# Patient Record
Sex: FEMALE | Race: OTHER | NOT HISPANIC OR LATINO
[De-identification: names, ages, dates, MRNs, and addresses within clinical notes are randomized per-mention and may not be internally consistent; named-entity substitution may affect disease eponyms.]

---

## 2017-07-26 ENCOUNTER — APPOINTMENT (OUTPATIENT)
Dept: MAMMOGRAPHY | Facility: IMAGING CENTER | Age: 57
End: 2017-07-26

## 2017-07-26 ENCOUNTER — OUTPATIENT (OUTPATIENT)
Dept: OUTPATIENT SERVICES | Facility: HOSPITAL | Age: 57
LOS: 1 days | End: 2017-07-26
Payer: MEDICAID

## 2017-07-26 DIAGNOSIS — Z00.8 ENCOUNTER FOR OTHER GENERAL EXAMINATION: ICD-10-CM

## 2017-07-26 PROCEDURE — 77067 SCR MAMMO BI INCL CAD: CPT

## 2017-07-26 PROCEDURE — 77063 BREAST TOMOSYNTHESIS BI: CPT

## 2017-10-04 ENCOUNTER — APPOINTMENT (OUTPATIENT)
Dept: OBGYN | Facility: HOSPITAL | Age: 57
End: 2017-10-04
Payer: MEDICAID

## 2017-10-04 ENCOUNTER — OUTPATIENT (OUTPATIENT)
Dept: OUTPATIENT SERVICES | Facility: HOSPITAL | Age: 57
LOS: 1 days | End: 2017-10-04

## 2017-10-04 VITALS
BODY MASS INDEX: 22.33 KG/M2 | WEIGHT: 156 LBS | HEART RATE: 76 BPM | DIASTOLIC BLOOD PRESSURE: 79 MMHG | HEIGHT: 70 IN | SYSTOLIC BLOOD PRESSURE: 132 MMHG

## 2017-10-04 DIAGNOSIS — Z83.3 FAMILY HISTORY OF DIABETES MELLITUS: ICD-10-CM

## 2017-10-04 DIAGNOSIS — Z84.1 FAMILY HISTORY OF DISORDERS OF KIDNEY AND URETER: ICD-10-CM

## 2017-10-04 DIAGNOSIS — Z82.49 FAMILY HISTORY OF ISCHEMIC HEART DISEASE AND OTHER DISEASES OF THE CIRCULATORY SYSTEM: ICD-10-CM

## 2017-10-04 PROCEDURE — 99386 PREV VISIT NEW AGE 40-64: CPT | Mod: GC

## 2017-10-05 DIAGNOSIS — Z01.419 ENCOUNTER FOR GYNECOLOGICAL EXAMINATION (GENERAL) (ROUTINE) WITHOUT ABNORMAL FINDINGS: ICD-10-CM

## 2017-10-20 ENCOUNTER — OUTPATIENT (OUTPATIENT)
Dept: OUTPATIENT SERVICES | Facility: HOSPITAL | Age: 57
LOS: 1 days | End: 2017-10-20
Payer: MEDICAID

## 2017-10-20 ENCOUNTER — APPOINTMENT (OUTPATIENT)
Dept: RADIOLOGY | Facility: IMAGING CENTER | Age: 57
End: 2017-10-20
Payer: MEDICAID

## 2017-10-20 DIAGNOSIS — Z00.8 ENCOUNTER FOR OTHER GENERAL EXAMINATION: ICD-10-CM

## 2017-10-20 PROCEDURE — 77080 DXA BONE DENSITY AXIAL: CPT

## 2017-10-20 PROCEDURE — 77080 DXA BONE DENSITY AXIAL: CPT | Mod: 26

## 2017-11-21 ENCOUNTER — APPOINTMENT (OUTPATIENT)
Dept: OPHTHALMOLOGY | Facility: CLINIC | Age: 57
End: 2017-11-21
Payer: MEDICAID

## 2017-11-21 DIAGNOSIS — Z82.49 FAMILY HISTORY OF ISCHEMIC HEART DISEASE AND OTHER DISEASES OF THE CIRCULATORY SYSTEM: ICD-10-CM

## 2017-11-21 DIAGNOSIS — Z98.890 OTHER SPECIFIED POSTPROCEDURAL STATES: ICD-10-CM

## 2017-11-21 DIAGNOSIS — Z78.9 OTHER SPECIFIED HEALTH STATUS: ICD-10-CM

## 2017-11-21 DIAGNOSIS — H04.123 DRY EYE SYNDROME OF BILATERAL LACRIMAL GLANDS: ICD-10-CM

## 2017-11-21 PROCEDURE — 92004 COMPRE OPH EXAM NEW PT 1/>: CPT

## 2017-12-21 ENCOUNTER — LABORATORY RESULT (OUTPATIENT)
Age: 57
End: 2017-12-21

## 2017-12-21 ENCOUNTER — OUTPATIENT (OUTPATIENT)
Dept: OUTPATIENT SERVICES | Facility: HOSPITAL | Age: 57
LOS: 1 days | End: 2017-12-21

## 2017-12-21 ENCOUNTER — APPOINTMENT (OUTPATIENT)
Dept: GASTROENTEROLOGY | Facility: HOSPITAL | Age: 57
End: 2017-12-21

## 2017-12-21 VITALS
SYSTOLIC BLOOD PRESSURE: 146 MMHG | DIASTOLIC BLOOD PRESSURE: 90 MMHG | HEIGHT: 70 IN | WEIGHT: 153 LBS | BODY MASS INDEX: 21.9 KG/M2 | HEART RATE: 86 BPM

## 2017-12-21 DIAGNOSIS — Z12.11 ENCOUNTER FOR SCREENING FOR MALIGNANT NEOPLASM OF COLON: ICD-10-CM

## 2017-12-21 DIAGNOSIS — Z01.419 ENCOUNTER FOR GYNECOLOGICAL EXAMINATION (GENERAL) (ROUTINE) W/OUT ABNORMAL FINDINGS: ICD-10-CM

## 2017-12-21 DIAGNOSIS — Z80.0 FAMILY HISTORY OF MALIGNANT NEOPLASM OF DIGESTIVE ORGANS: ICD-10-CM

## 2017-12-21 DIAGNOSIS — Z01.419 ENCOUNTER FOR GYNECOLOGICAL EXAMINATION (GENERAL) (ROUTINE) WITHOUT ABNORMAL FINDINGS: ICD-10-CM

## 2017-12-21 DIAGNOSIS — Z77.22 CONTACT WITH AND (SUSPECTED) EXPOSURE TO ENVIRONMENTAL TOBACCO SMOKE (ACUTE) (CHRONIC): ICD-10-CM

## 2017-12-21 LAB
ALBUMIN SERPL ELPH-MCNC: 4.1 G/DL — SIGNIFICANT CHANGE UP (ref 3.3–5)
ALP SERPL-CCNC: 54 U/L — SIGNIFICANT CHANGE UP (ref 40–120)
ALT FLD-CCNC: 13 U/L — SIGNIFICANT CHANGE UP (ref 4–33)
AST SERPL-CCNC: 18 U/L — SIGNIFICANT CHANGE UP (ref 4–32)
BASOPHILS # BLD AUTO: 0.03 K/UL — SIGNIFICANT CHANGE UP (ref 0–0.2)
BASOPHILS NFR BLD AUTO: 0.5 % — SIGNIFICANT CHANGE UP (ref 0–2)
BILIRUB SERPL-MCNC: 0.3 MG/DL — SIGNIFICANT CHANGE UP (ref 0.2–1.2)
BUN SERPL-MCNC: 11 MG/DL — SIGNIFICANT CHANGE UP (ref 7–23)
CALCIUM SERPL-MCNC: 9.2 MG/DL — SIGNIFICANT CHANGE UP (ref 8.4–10.5)
CHLORIDE SERPL-SCNC: 104 MMOL/L — SIGNIFICANT CHANGE UP (ref 98–107)
CO2 SERPL-SCNC: 28 MMOL/L — SIGNIFICANT CHANGE UP (ref 22–31)
CREAT SERPL-MCNC: 0.76 MG/DL — SIGNIFICANT CHANGE UP (ref 0.5–1.3)
EOSINOPHIL # BLD AUTO: 0.05 K/UL — SIGNIFICANT CHANGE UP (ref 0–0.5)
EOSINOPHIL NFR BLD AUTO: 0.9 % — SIGNIFICANT CHANGE UP (ref 0–6)
GLUCOSE SERPL-MCNC: 100 MG/DL — HIGH (ref 70–99)
HCT VFR BLD CALC: 38.4 % — SIGNIFICANT CHANGE UP (ref 34.5–45)
HCV AB S/CO SERPL IA: 0.18 S/CO — SIGNIFICANT CHANGE UP
HCV AB SERPL-IMP: SIGNIFICANT CHANGE UP
HGB BLD-MCNC: 11.9 G/DL — SIGNIFICANT CHANGE UP (ref 11.5–15.5)
IMM GRANULOCYTES # BLD AUTO: 0.01 # — SIGNIFICANT CHANGE UP
IMM GRANULOCYTES NFR BLD AUTO: 0.2 % — SIGNIFICANT CHANGE UP (ref 0–1.5)
LYMPHOCYTES # BLD AUTO: 2.09 K/UL — SIGNIFICANT CHANGE UP (ref 1–3.3)
LYMPHOCYTES # BLD AUTO: 38.1 % — SIGNIFICANT CHANGE UP (ref 13–44)
MCHC RBC-ENTMCNC: 23.1 PG — LOW (ref 27–34)
MCHC RBC-ENTMCNC: 31 % — LOW (ref 32–36)
MCV RBC AUTO: 74.6 FL — LOW (ref 80–100)
MONOCYTES # BLD AUTO: 0.34 K/UL — SIGNIFICANT CHANGE UP (ref 0–0.9)
MONOCYTES NFR BLD AUTO: 6.2 % — SIGNIFICANT CHANGE UP (ref 2–14)
NEUTROPHILS # BLD AUTO: 2.96 K/UL — SIGNIFICANT CHANGE UP (ref 1.8–7.4)
NEUTROPHILS NFR BLD AUTO: 54.1 % — SIGNIFICANT CHANGE UP (ref 43–77)
NRBC # FLD: 0 — SIGNIFICANT CHANGE UP
PLATELET # BLD AUTO: 178 K/UL — SIGNIFICANT CHANGE UP (ref 150–400)
PMV BLD: 12.4 FL — SIGNIFICANT CHANGE UP (ref 7–13)
POTASSIUM SERPL-MCNC: 4.9 MMOL/L — SIGNIFICANT CHANGE UP (ref 3.5–5.3)
POTASSIUM SERPL-SCNC: 4.9 MMOL/L — SIGNIFICANT CHANGE UP (ref 3.5–5.3)
PROT SERPL-MCNC: 7.1 G/DL — SIGNIFICANT CHANGE UP (ref 6–8.3)
RBC # BLD: 5.15 M/UL — SIGNIFICANT CHANGE UP (ref 3.8–5.2)
RBC # FLD: 14.4 % — SIGNIFICANT CHANGE UP (ref 10.3–14.5)
SODIUM SERPL-SCNC: 143 MMOL/L — SIGNIFICANT CHANGE UP (ref 135–145)
WBC # BLD: 5.48 K/UL — SIGNIFICANT CHANGE UP (ref 3.8–10.5)
WBC # FLD AUTO: 5.48 K/UL — SIGNIFICANT CHANGE UP (ref 3.8–10.5)

## 2017-12-27 ENCOUNTER — CHART COPY (OUTPATIENT)
Age: 57
End: 2017-12-27

## 2018-01-16 ENCOUNTER — OUTPATIENT (OUTPATIENT)
Dept: OUTPATIENT SERVICES | Facility: HOSPITAL | Age: 58
LOS: 1 days | Discharge: HOME | End: 2018-01-16
Payer: MEDICAID

## 2018-01-16 DIAGNOSIS — Z12.11 ENCOUNTER FOR SCREENING FOR MALIGNANT NEOPLASM OF COLON: ICD-10-CM

## 2018-01-16 PROCEDURE — G0121 COLON CA SCRN NOT HI RSK IND: CPT | Mod: GC

## 2018-02-08 ENCOUNTER — APPOINTMENT (OUTPATIENT)
Dept: GASTROENTEROLOGY | Facility: HOSPITAL | Age: 58
End: 2018-02-08
Payer: MEDICAID

## 2018-02-08 ENCOUNTER — OUTPATIENT (OUTPATIENT)
Dept: OUTPATIENT SERVICES | Facility: HOSPITAL | Age: 58
LOS: 1 days | End: 2018-02-08

## 2018-02-08 VITALS
HEIGHT: 70 IN | SYSTOLIC BLOOD PRESSURE: 131 MMHG | HEART RATE: 69 BPM | DIASTOLIC BLOOD PRESSURE: 87 MMHG | BODY MASS INDEX: 22.33 KG/M2 | WEIGHT: 155.97 LBS

## 2018-02-08 DIAGNOSIS — K59.09 OTHER CONSTIPATION: ICD-10-CM

## 2018-02-08 DIAGNOSIS — R71.8 OTHER ABNORMALITY OF RED BLOOD CELLS: ICD-10-CM

## 2018-02-08 DIAGNOSIS — Z86.010 PERSONAL HISTORY OF COLONIC POLYPS: ICD-10-CM

## 2018-02-08 DIAGNOSIS — Z83.71 FAMILY HISTORY OF COLONIC POLYPS: ICD-10-CM

## 2018-02-08 DIAGNOSIS — K57.90 DIVERTICULOSIS OF INTESTINE, PART UNSPECIFIED, WITHOUT PERFORATION OR ABSCESS WITHOUT BLEEDING: ICD-10-CM

## 2018-02-08 DIAGNOSIS — Z12.11 ENCOUNTER FOR SCREENING FOR MALIGNANT NEOPLASM OF COLON: ICD-10-CM

## 2018-02-08 DIAGNOSIS — K57.90 DIVERTICULOSIS OF INTESTINE, PART UNSPECIFIED, W/OUT PERFORATION OR ABSCESS W/OUT BLEEDING: ICD-10-CM

## 2018-02-08 PROCEDURE — 99213 OFFICE O/P EST LOW 20 MIN: CPT | Mod: GC

## 2018-02-08 RX ORDER — MAGNESIUM CITRATE
SOLUTION, ORAL ORAL
Qty: 2 | Refills: 0 | Status: COMPLETED | COMMUNITY
Start: 2017-12-21 | End: 2018-02-08

## 2018-07-27 PROBLEM — Z80.0 FAMILY HISTORY OF COLON CANCER: Status: INACTIVE | Noted: 2017-12-21

## 2020-12-16 PROBLEM — Z12.11 ENCOUNTER FOR SCREENING COLONOSCOPY: Status: RESOLVED | Noted: 2017-12-21 | Resolved: 2020-12-16

## 2021-08-20 ENCOUNTER — OUTPATIENT (OUTPATIENT)
Dept: OUTPATIENT SERVICES | Facility: HOSPITAL | Age: 61
LOS: 1 days | End: 2021-08-20
Payer: MEDICAID

## 2021-08-20 ENCOUNTER — APPOINTMENT (OUTPATIENT)
Dept: MAMMOGRAPHY | Facility: IMAGING CENTER | Age: 61
End: 2021-08-20
Payer: MEDICAID

## 2021-08-20 DIAGNOSIS — Z00.8 ENCOUNTER FOR OTHER GENERAL EXAMINATION: ICD-10-CM

## 2021-08-20 PROCEDURE — 77067 SCR MAMMO BI INCL CAD: CPT | Mod: 26

## 2021-08-20 PROCEDURE — 77063 BREAST TOMOSYNTHESIS BI: CPT | Mod: 26

## 2021-08-20 PROCEDURE — 77067 SCR MAMMO BI INCL CAD: CPT

## 2021-08-20 PROCEDURE — 77063 BREAST TOMOSYNTHESIS BI: CPT

## 2021-08-27 ENCOUNTER — OUTPATIENT (OUTPATIENT)
Dept: OUTPATIENT SERVICES | Facility: HOSPITAL | Age: 61
LOS: 1 days | End: 2021-08-27
Payer: MEDICAID

## 2021-08-27 ENCOUNTER — APPOINTMENT (OUTPATIENT)
Dept: MAMMOGRAPHY | Facility: IMAGING CENTER | Age: 61
End: 2021-08-27
Payer: MEDICAID

## 2021-08-27 ENCOUNTER — APPOINTMENT (OUTPATIENT)
Dept: ULTRASOUND IMAGING | Facility: IMAGING CENTER | Age: 61
End: 2021-08-27
Payer: MEDICAID

## 2021-08-27 DIAGNOSIS — Z00.8 ENCOUNTER FOR OTHER GENERAL EXAMINATION: ICD-10-CM

## 2021-08-27 PROCEDURE — 77061 BREAST TOMOSYNTHESIS UNI: CPT | Mod: 26

## 2021-08-27 PROCEDURE — 76642 ULTRASOUND BREAST LIMITED: CPT

## 2021-08-27 PROCEDURE — 76642 ULTRASOUND BREAST LIMITED: CPT | Mod: 26,RT

## 2021-08-27 PROCEDURE — 77065 DX MAMMO INCL CAD UNI: CPT | Mod: 26,RT

## 2021-08-27 PROCEDURE — G0279: CPT

## 2021-08-27 PROCEDURE — 77065 DX MAMMO INCL CAD UNI: CPT

## 2021-09-01 ENCOUNTER — RESULT REVIEW (OUTPATIENT)
Age: 61
End: 2021-09-01

## 2021-09-01 ENCOUNTER — APPOINTMENT (OUTPATIENT)
Dept: ULTRASOUND IMAGING | Facility: IMAGING CENTER | Age: 61
End: 2021-09-01

## 2021-09-01 ENCOUNTER — OUTPATIENT (OUTPATIENT)
Dept: OUTPATIENT SERVICES | Facility: HOSPITAL | Age: 61
LOS: 1 days | End: 2021-09-01
Payer: MEDICAID

## 2021-09-01 ENCOUNTER — APPOINTMENT (OUTPATIENT)
Dept: ULTRASOUND IMAGING | Facility: IMAGING CENTER | Age: 61
End: 2021-09-01
Payer: MEDICAID

## 2021-09-01 DIAGNOSIS — Z00.8 ENCOUNTER FOR OTHER GENERAL EXAMINATION: ICD-10-CM

## 2021-09-01 PROCEDURE — 88305 TISSUE EXAM BY PATHOLOGIST: CPT

## 2021-09-01 PROCEDURE — 77065 DX MAMMO INCL CAD UNI: CPT

## 2021-09-01 PROCEDURE — 19084 BX BREAST ADD LESION US IMAG: CPT

## 2021-09-01 PROCEDURE — 88305 TISSUE EXAM BY PATHOLOGIST: CPT | Mod: 26

## 2021-09-01 PROCEDURE — 77065 DX MAMMO INCL CAD UNI: CPT | Mod: 26,RT

## 2021-09-01 PROCEDURE — A4648: CPT

## 2021-09-01 PROCEDURE — 19084 BX BREAST ADD LESION US IMAG: CPT | Mod: RT

## 2021-09-01 PROCEDURE — 19083 BX BREAST 1ST LESION US IMAG: CPT

## 2021-09-01 PROCEDURE — 19083 BX BREAST 1ST LESION US IMAG: CPT | Mod: RT

## 2021-09-27 ENCOUNTER — APPOINTMENT (OUTPATIENT)
Dept: SURGICAL ONCOLOGY | Facility: CLINIC | Age: 61
End: 2021-09-27
Payer: MEDICAID

## 2021-09-27 VITALS
HEART RATE: 79 BPM | OXYGEN SATURATION: 97 % | DIASTOLIC BLOOD PRESSURE: 79 MMHG | HEIGHT: 69 IN | SYSTOLIC BLOOD PRESSURE: 124 MMHG | BODY MASS INDEX: 25.18 KG/M2 | WEIGHT: 170 LBS

## 2021-09-27 PROCEDURE — 99214 OFFICE O/P EST MOD 30 MIN: CPT

## 2021-09-27 NOTE — HISTORY OF PRESENT ILLNESS
[de-identified] : Polina is a pleasant 61 year old female who presents today for an initial consultation.\par \par She underwent her annual routine screening mammogram and sonogram on 8/21/21. This study demonstrated focal asymmetry within the lower right breast, as above. Further evaluation with full right ML view, spot compression views, and targeted ultrasound, as warranted, is advised, BI-RADS 0.\par \par As follow up, she underwent a right diagnostic mammogram and target right breast ultrasound on 8/30/21. This noted highly suspicious masses at right breast 3:00 and 6:00. Ultrasound-guided core needle biopsy is recommended for further evaluation, BI-RADS 5. \par \par She underwent a right breast US guided biopsy x 2 sites on 9/9/21. Right breast 3:00 2 cm FN, complex sclerosing papillary lesion focal microcalcifications.  Right breast 6:00 3 cm FN, intraductal papilloma, proliferative fibrocystic change with microcalcifications. These results were concordant.\par \par She has no significant past medical history. She states in the past she was anemic in the past due to fibroids, she had a hysterectomy 2005. She denies a personal or family history of breast cancer. \par \par Today, she denies palpable breast masses, nipple discharge, skin changes, inversion or breast pain. Denies constitutional symptoms.\par

## 2021-09-27 NOTE — CONSULT LETTER
[Dear  ___] : Dear  [unfilled], [Consult Letter:] : I had the pleasure of evaluating your patient, [unfilled]. [Please see my note below.] : Please see my note below. [Consult Closing:] : Thank you very much for allowing me to participate in the care of this patient.  If you have any questions, please do not hesitate to contact me. [Sincerely,] : Sincerely, [FreeTextEntry2] : Matt Louis MD [FreeTextEntry3] : Umesh Quan MD\par Surgical Oncology\par [facility]\par Office: [phone]\par Cell: [phone]\par

## 2021-09-27 NOTE — PHYSICAL EXAM
[FreeTextEntry1] : SM present during exam.  [Normal] : normal breast inspection and palpation of axillas [Normal Neck Lymph Nodes] : normal neck lymph nodes  [Normal Supraclavicular Lymph Nodes] : normal supraclavicular lymph nodes [Normal Groin Lymph Nodes] : normal groin lymph nodes [Normal Axillary Lymph Nodes] : normal axillary lymph nodes [Normal] : oriented to person, place and time, with appropriate affect

## 2021-09-27 NOTE — ASSESSMENT
[FreeTextEntry1] : We discussed the need for 2 sites and  localized right breast excisional biopsy. We discussed the risks, benefits and alternatives of the procedure.  We also discussed post operative expectations and possible complications.  She expresses understanding and agrees to proceed.\par

## 2021-10-11 ENCOUNTER — OUTPATIENT (OUTPATIENT)
Dept: OUTPATIENT SERVICES | Facility: HOSPITAL | Age: 61
LOS: 1 days | End: 2021-10-11

## 2021-10-11 VITALS
DIASTOLIC BLOOD PRESSURE: 80 MMHG | WEIGHT: 169.98 LBS | SYSTOLIC BLOOD PRESSURE: 130 MMHG | HEART RATE: 80 BPM | TEMPERATURE: 98 F | RESPIRATION RATE: 16 BRPM | HEIGHT: 67 IN | OXYGEN SATURATION: 99 %

## 2021-10-11 DIAGNOSIS — D24.1 BENIGN NEOPLASM OF RIGHT BREAST: ICD-10-CM

## 2021-10-11 DIAGNOSIS — Z90.710 ACQUIRED ABSENCE OF BOTH CERVIX AND UTERUS: Chronic | ICD-10-CM

## 2021-10-11 NOTE — H&P PST ADULT - PROBLEM SELECTOR PLAN 1
Patient tentatively scheduled for excision right breast lumpectomy x 2 post saviscout localization x2 on 10/19/21.  Pre-op instructions provided. Pt given verbal and written instructions with teach back on chlorhexidine shampoo and pepcid. Pt verbalized understanding with return demonstration.   Covid testing scheduled prior to surgery as per patient.   Patient obtained medical evaluation as per surgeon, Copy in chart.

## 2021-10-11 NOTE — H&P PST ADULT - HISTORY OF PRESENT ILLNESS
61 year old female with no PMH  presents to Presurgical testing with diagnosis of benign neoplasm of right breast scheduled for excision right breast lumpectomy x 2 post saviscout localization x2. Patient reports that she had her routine mammogram found to be abnormal, s/p biopsy which revealed  papillary lesion. Patient c/o mild tenderness to right breast s/p biopsy

## 2021-10-11 NOTE — H&P PST ADULT - ATTENDING COMMENTS
D/w pt plan for right breast papilloma, complex sclerosing lesion exicision with saviscout loc excision x 2    Discussed r/b/a post op expectations poss complications.      Pt understands and agrees to proceed.

## 2021-10-15 ENCOUNTER — APPOINTMENT (OUTPATIENT)
Dept: ULTRASOUND IMAGING | Facility: IMAGING CENTER | Age: 61
End: 2021-10-15
Payer: MEDICAID

## 2021-10-15 ENCOUNTER — RESULT REVIEW (OUTPATIENT)
Age: 61
End: 2021-10-15

## 2021-10-15 ENCOUNTER — OUTPATIENT (OUTPATIENT)
Dept: OUTPATIENT SERVICES | Facility: HOSPITAL | Age: 61
LOS: 1 days | End: 2021-10-15
Payer: MEDICAID

## 2021-10-15 DIAGNOSIS — Z90.710 ACQUIRED ABSENCE OF BOTH CERVIX AND UTERUS: Chronic | ICD-10-CM

## 2021-10-15 DIAGNOSIS — Z00.8 ENCOUNTER FOR OTHER GENERAL EXAMINATION: ICD-10-CM

## 2021-10-15 PROBLEM — Z78.9 OTHER SPECIFIED HEALTH STATUS: Chronic | Status: ACTIVE | Noted: 2021-10-11

## 2021-10-15 PROCEDURE — 19285 PERQ DEV BREAST 1ST US IMAG: CPT

## 2021-10-15 PROCEDURE — 19286 PERQ DEV BREAST ADD US IMAG: CPT

## 2021-10-15 PROCEDURE — C1739: CPT

## 2021-10-15 PROCEDURE — 19285 PERQ DEV BREAST 1ST US IMAG: CPT | Mod: RT

## 2021-10-15 PROCEDURE — 19286 PERQ DEV BREAST ADD US IMAG: CPT | Mod: RT

## 2021-10-16 ENCOUNTER — APPOINTMENT (OUTPATIENT)
Dept: DISASTER EMERGENCY | Facility: CLINIC | Age: 61
End: 2021-10-16

## 2021-10-16 DIAGNOSIS — Z01.818 ENCOUNTER FOR OTHER PREPROCEDURAL EXAMINATION: ICD-10-CM

## 2021-10-17 LAB — SARS-COV-2 N GENE NPH QL NAA+PROBE: NOT DETECTED

## 2021-10-18 ENCOUNTER — TRANSCRIPTION ENCOUNTER (OUTPATIENT)
Age: 61
End: 2021-10-18

## 2021-10-18 VITALS
DIASTOLIC BLOOD PRESSURE: 74 MMHG | RESPIRATION RATE: 16 BRPM | WEIGHT: 169.98 LBS | SYSTOLIC BLOOD PRESSURE: 143 MMHG | OXYGEN SATURATION: 100 % | HEIGHT: 67 IN | TEMPERATURE: 97 F | HEART RATE: 90 BPM

## 2021-10-19 ENCOUNTER — RESULT REVIEW (OUTPATIENT)
Age: 61
End: 2021-10-19

## 2021-10-19 ENCOUNTER — NON-APPOINTMENT (OUTPATIENT)
Age: 61
End: 2021-10-19

## 2021-10-19 ENCOUNTER — APPOINTMENT (OUTPATIENT)
Dept: SURGICAL ONCOLOGY | Facility: AMBULATORY SURGERY CENTER | Age: 61
End: 2021-10-19

## 2021-10-19 ENCOUNTER — OUTPATIENT (OUTPATIENT)
Dept: OUTPATIENT SERVICES | Facility: HOSPITAL | Age: 61
LOS: 1 days | Discharge: HOME | End: 2021-10-19
Payer: MEDICAID

## 2021-10-19 ENCOUNTER — APPOINTMENT (OUTPATIENT)
Dept: MAMMOGRAPHY | Facility: IMAGING CENTER | Age: 61
End: 2021-10-19
Payer: MEDICAID

## 2021-10-19 ENCOUNTER — OUTPATIENT (OUTPATIENT)
Dept: OUTPATIENT SERVICES | Facility: HOSPITAL | Age: 61
LOS: 1 days | End: 2021-10-19
Payer: COMMERCIAL

## 2021-10-19 VITALS
TEMPERATURE: 97 F | OXYGEN SATURATION: 98 % | SYSTOLIC BLOOD PRESSURE: 115 MMHG | DIASTOLIC BLOOD PRESSURE: 60 MMHG | RESPIRATION RATE: 17 BRPM | HEART RATE: 69 BPM

## 2021-10-19 DIAGNOSIS — Z90.710 ACQUIRED ABSENCE OF BOTH CERVIX AND UTERUS: Chronic | ICD-10-CM

## 2021-10-19 DIAGNOSIS — Z00.8 ENCOUNTER FOR OTHER GENERAL EXAMINATION: ICD-10-CM

## 2021-10-19 DIAGNOSIS — D24.1 BENIGN NEOPLASM OF RIGHT BREAST: ICD-10-CM

## 2021-10-19 PROCEDURE — 19125 EXCISION BREAST LESION: CPT

## 2021-10-19 PROCEDURE — 76098 X-RAY EXAM SURGICAL SPECIMEN: CPT | Mod: 26

## 2021-10-19 PROCEDURE — 76098 X-RAY EXAM SURGICAL SPECIMEN: CPT

## 2021-10-19 PROCEDURE — 88307 TISSUE EXAM BY PATHOLOGIST: CPT | Mod: 26

## 2021-10-19 RX ORDER — PREGABALIN 225 MG/1
1 CAPSULE ORAL
Qty: 0 | Refills: 0 | DISCHARGE

## 2021-10-19 RX ORDER — SODIUM CHLORIDE 9 MG/ML
1000 INJECTION, SOLUTION INTRAVENOUS
Refills: 0 | Status: DISCONTINUED | OUTPATIENT
Start: 2021-10-19 | End: 2021-11-02

## 2021-10-19 NOTE — ASU DISCHARGE PLAN (ADULT/PEDIATRIC) - CARE PROVIDER_API CALL
Umesh Quan)  Surgery  [address] [place] [address]  Phone: [phone]  Fax: [phone]  Follow Up Time:

## 2021-10-19 NOTE — ASU DISCHARGE PLAN (ADULT/PEDIATRIC) - ASU DC SPECIAL INSTRUCTIONSFT
Call Dr. Quan's office tomorrow for a follow up appointment in 2 weeks, post right breast lumpectomy.   Sponge bathe until the day after tomorrow, 48 hours.   Do not take a bath for 2 weeks.  In 2 days, shower, take the clear bandage off of the breast with the white gauze. KEEP on the steri strips (white tape) on until they fall off in about 7-10 days. When you shower, just allow water to run on the breast, do not scrub. Pat dry gently and keep on the white tape.    Take Percocet as needed for pain, as directed and sent to your pharmacy.

## 2021-10-19 NOTE — ASU DISCHARGE PLAN (ADULT/PEDIATRIC) - BATHING
for 2 days/Sponge only/Do not submerge in water for 2 days/Shower only/Sponge only/Do not submerge in water

## 2021-10-19 NOTE — ASU DISCHARGE PLAN (ADULT/PEDIATRIC) - NURSING INSTRUCTIONS
DO NOT take any Tylenol (Acetaminophen) or narcotics containing Tylenol until after  3:30pm . You received Tylenol during your operation and it can cause damage to your liver if too much is taken within a 24 hour time period.

## 2021-10-26 LAB — SURGICAL PATHOLOGY STUDY: SIGNIFICANT CHANGE UP

## 2021-11-01 ENCOUNTER — APPOINTMENT (OUTPATIENT)
Dept: SURGICAL ONCOLOGY | Facility: CLINIC | Age: 61
End: 2021-11-01
Payer: MEDICAID

## 2021-11-04 ENCOUNTER — APPOINTMENT (OUTPATIENT)
Dept: SURGICAL ONCOLOGY | Facility: CLINIC | Age: 61
End: 2021-11-04
Payer: MEDICAID

## 2021-11-04 VITALS
HEART RATE: 82 BPM | OXYGEN SATURATION: 97 % | SYSTOLIC BLOOD PRESSURE: 110 MMHG | TEMPERATURE: 97 F | DIASTOLIC BLOOD PRESSURE: 77 MMHG | RESPIRATION RATE: 17 BRPM

## 2021-11-04 PROCEDURE — 99024 POSTOP FOLLOW-UP VISIT: CPT

## 2021-11-09 NOTE — PHYSICAL EXAM
[Normal] : well developed, well nourished, in no acute distress [de-identified] : Left breast incision healing well with no evidence of infection or hematoma, resolving ecchymosis

## 2021-11-09 NOTE — CONSULT LETTER
[FreeTextEntry2] : Matt Louis MD [FreeTextEntry3] : Umesh Quan MD\par Surgical Oncology\par [facility]\par Office: [phone]\par Cell: [phone]\par

## 2021-11-09 NOTE — HISTORY OF PRESENT ILLNESS
[de-identified] : Polina is a pleasant 61 year old female who presents today for an initial  post operative visit. She is s/p TIBURCIO  localized right breast excisional biopsies x 2 on 10/19/21. Final pathology revealed a diagnosis of proliferative fibrocystic changes including multiple complex sclerosing lesions, florid usual duct hyperplasia, intraductal papillomatosis, sclerosing adenosis and microcalcifications.  Her left breast feels sore but there is no significant pain and she denies any fever, erythema or drainage at the incision site.\par \par PREVIOUS HISTORY:\par \par She underwent her annual routine screening mammogram and sonogram on 8/21/21. This study demonstrated focal asymmetry within the lower right breast, as above. Further evaluation with full right ML view, spot compression views, and targeted ultrasound, as warranted, is advised, BI-RADS 0.\par \par As follow up, she underwent a right diagnostic mammogram and target right breast ultrasound on 8/30/21. This noted highly suspicious masses at right breast 3:00 and 6:00. Ultrasound-guided core needle biopsy is recommended for further evaluation, BI-RADS 5. \par \par She underwent a right breast US guided biopsy x 2 sites on 9/9/21. Right breast 3:00 2 cm FN, complex sclerosing papillary lesion focal microcalcifications.  Right breast 6:00 3 cm FN, intraductal papilloma, proliferative fibrocystic change with microcalcifications. These results were concordant.\par \par She has no significant past medical history. She states in the past she was anemic in the past due to fibroids, she had a hysterectomy 2005. She denies a personal or family history of breast cancer. \par \par

## 2022-02-14 ENCOUNTER — RESULT REVIEW (OUTPATIENT)
Age: 62
End: 2022-02-14

## 2022-02-14 ENCOUNTER — APPOINTMENT (OUTPATIENT)
Dept: SURGICAL ONCOLOGY | Facility: CLINIC | Age: 62
End: 2022-02-14
Payer: MEDICAID

## 2022-02-14 VITALS
DIASTOLIC BLOOD PRESSURE: 86 MMHG | HEIGHT: 69 IN | SYSTOLIC BLOOD PRESSURE: 143 MMHG | TEMPERATURE: 97.6 F | WEIGHT: 168 LBS | HEART RATE: 103 BPM | BODY MASS INDEX: 24.88 KG/M2 | RESPIRATION RATE: 17 BRPM | OXYGEN SATURATION: 95 %

## 2022-02-14 PROCEDURE — 99214 OFFICE O/P EST MOD 30 MIN: CPT

## 2022-02-14 NOTE — HISTORY OF PRESENT ILLNESS
[de-identified] : Polina is a pleasant 61 year old female who presents today for a follow up visit. She is s/p TIBURCIO  localized right breast excisional biopsies x 2 on 10/19/21. Final pathology revealed a diagnosis of proliferative fibrocystic changes including multiple complex sclerosing lesions, florid usual duct hyperplasia, intraductal papillomatosis, sclerosing adenosis and microcalcifications.  Her left breast feels sore but there is no significant pain and she denies any fever, erythema or drainage at the incision site.\par \par \par PREVIOUS HISTORY:\par She underwent her annual routine screening mammogram and sonogram on 8/21/21. This study demonstrated focal asymmetry within the lower right breast, as above. Further evaluation with full right ML view, spot compression views, and targeted ultrasound, as warranted, is advised, BI-RADS 0.\par \par As follow up, she underwent a right diagnostic mammogram and target right breast ultrasound on 8/30/21. This noted highly suspicious masses at right breast 3:00 and 6:00. Ultrasound-guided core needle biopsy is recommended for further evaluation, BI-RADS 5. \par \par She underwent a right breast US guided biopsy x 2 sites on 9/9/21. Right breast 3:00 2 cm FN, complex sclerosing papillary lesion focal microcalcifications.  Right breast 6:00 3 cm FN, intraductal papilloma, proliferative fibrocystic change with microcalcifications. These results were concordant.\par \par She has no significant past medical history. She states in the past she was anemic in the past due to fibroids, she had a hysterectomy 2005. She denies a personal or family history of breast cancer. \par \par

## 2022-02-14 NOTE — PHYSICAL EXAM
[Normal] : normal breast inspection and palpation of axillas [Normal Neck Lymph Nodes] : normal neck lymph nodes  [Normal Supraclavicular Lymph Nodes] : normal supraclavicular lymph nodes [Normal Groin Lymph Nodes] : normal groin lymph nodes [Normal Axillary Lymph Nodes] : normal axillary lymph nodes [Normal] : oriented to person, place and time, with appropriate affect [de-identified] : Well-healed right breast circumareolar incision.

## 2022-08-19 ENCOUNTER — RESULT REVIEW (OUTPATIENT)
Age: 62
End: 2022-08-19

## 2022-08-19 ENCOUNTER — APPOINTMENT (OUTPATIENT)
Dept: MAMMOGRAPHY | Facility: IMAGING CENTER | Age: 62
End: 2022-08-19

## 2022-08-19 ENCOUNTER — APPOINTMENT (OUTPATIENT)
Dept: ULTRASOUND IMAGING | Facility: IMAGING CENTER | Age: 62
End: 2022-08-19

## 2022-08-19 ENCOUNTER — OUTPATIENT (OUTPATIENT)
Dept: OUTPATIENT SERVICES | Facility: HOSPITAL | Age: 62
LOS: 1 days | End: 2022-08-19
Payer: MEDICAID

## 2022-08-19 DIAGNOSIS — Z00.8 ENCOUNTER FOR OTHER GENERAL EXAMINATION: ICD-10-CM

## 2022-08-19 DIAGNOSIS — Z90.710 ACQUIRED ABSENCE OF BOTH CERVIX AND UTERUS: Chronic | ICD-10-CM

## 2022-08-19 PROCEDURE — 77066 DX MAMMO INCL CAD BI: CPT | Mod: 26

## 2022-08-19 PROCEDURE — G0279: CPT

## 2022-08-19 PROCEDURE — 76641 ULTRASOUND BREAST COMPLETE: CPT | Mod: 26,50

## 2022-08-19 PROCEDURE — 77066 DX MAMMO INCL CAD BI: CPT

## 2022-08-19 PROCEDURE — 76641 ULTRASOUND BREAST COMPLETE: CPT

## 2022-08-19 PROCEDURE — 77062 BREAST TOMOSYNTHESIS BI: CPT | Mod: 26

## 2022-08-25 ENCOUNTER — APPOINTMENT (OUTPATIENT)
Dept: SURGICAL ONCOLOGY | Facility: CLINIC | Age: 62
End: 2022-08-25

## 2022-08-25 VITALS
HEART RATE: 69 BPM | DIASTOLIC BLOOD PRESSURE: 85 MMHG | OXYGEN SATURATION: 98 % | HEIGHT: 69 IN | TEMPERATURE: 98.8 F | WEIGHT: 170 LBS | RESPIRATION RATE: 16 BRPM | SYSTOLIC BLOOD PRESSURE: 125 MMHG | BODY MASS INDEX: 25.18 KG/M2

## 2022-08-25 PROCEDURE — 99214 OFFICE O/P EST MOD 30 MIN: CPT

## 2022-08-25 NOTE — CONSULT LETTER
[Dear  ___] : Dear  [unfilled], [Consult Letter:] : I had the pleasure of evaluating your patient, [unfilled]. [Please see my note below.] : Please see my note below. [Consult Closing:] : Thank you very much for allowing me to participate in the care of this patient.  If you have any questions, please do not hesitate to contact me. [Sincerely,] : Sincerely, [FreeTextEntry2] : Matt Louis MD \par  [FreeTextEntry3] : Umesh Quan MD\par Surgical Oncology\par [facility]\par Office: [phone]\par Cell: [phone]\par

## 2022-08-25 NOTE — PHYSICAL EXAM
[Normal] : normal breast inspection and palpation of axillas [Normal Neck Lymph Nodes] : normal neck lymph nodes  [Normal Supraclavicular Lymph Nodes] : normal supraclavicular lymph nodes [Normal Groin Lymph Nodes] : normal groin lymph nodes [Normal Axillary Lymph Nodes] : normal axillary lymph nodes [Normal] : oriented to person, place and time, with appropriate affect [FreeTextEntry1] : Medical staff ( MB  ) present during exam\par  [de-identified] : Well-healed right breast circumareolar incision.

## 2022-08-25 NOTE — HISTORY OF PRESENT ILLNESS
[de-identified] : Polina is a pleasant 62 year old female who presents today for a follow up visit. She is s/p TIBURCIO  localized right breast excisional biopsies x 2 on 10/19/21. Final pathology revealed a diagnosis of proliferative fibrocystic changes including multiple complex sclerosing lesions, florid usual duct hyperplasia, intraductal papillomatosis, sclerosing adenosis and microcalcifications.  Her left breast feels sore but there is no significant pain and she denies any fever, erythema or drainage at the incision site.\par \par Her most recent MMG/ US on 8/19/22 revealed no evidence of malignancy BI-RADS 2\par \par PREVIOUS HISTORY:\par She underwent her annual routine screening mammogram and sonogram on 8/21/21. This study demonstrated focal asymmetry within the lower right breast, as above. Further evaluation with full right ML view, spot compression views, and targeted ultrasound, as warranted, is advised, BI-RADS 0.\par \par As follow up, she underwent a right diagnostic mammogram and target right breast ultrasound on 8/30/21. This noted highly suspicious masses at right breast 3:00 and 6:00. Ultrasound-guided core needle biopsy is recommended for further evaluation, BI-RADS 5. \par \par She underwent a right breast US guided biopsy x 2 sites on 9/9/21. Right breast 3:00 2 cm FN, complex sclerosing papillary lesion focal microcalcifications.  Right breast 6:00 3 cm FN, intraductal papilloma, proliferative fibrocystic change with microcalcifications. These results were concordant.\par \par She has no significant past medical history. She states in the past she was anemic in the past due to fibroids, she had a hysterectomy 2005. She denies a personal or family history of breast cancer. \par \par

## 2022-08-25 NOTE — ASSESSMENT
[FreeTextEntry1] : Polina is doing well.  She will follow-up with us in 1 year upon completion of annual breast imaging.

## 2023-08-21 ENCOUNTER — APPOINTMENT (OUTPATIENT)
Dept: SURGICAL ONCOLOGY | Facility: CLINIC | Age: 63
End: 2023-08-21

## 2023-08-22 ENCOUNTER — RESULT REVIEW (OUTPATIENT)
Age: 63
End: 2023-08-22

## 2023-08-22 ENCOUNTER — APPOINTMENT (OUTPATIENT)
Dept: ULTRASOUND IMAGING | Facility: IMAGING CENTER | Age: 63
End: 2023-08-22
Payer: MEDICAID

## 2023-08-22 ENCOUNTER — APPOINTMENT (OUTPATIENT)
Dept: MAMMOGRAPHY | Facility: IMAGING CENTER | Age: 63
End: 2023-08-22
Payer: MEDICAID

## 2023-08-22 ENCOUNTER — OUTPATIENT (OUTPATIENT)
Dept: OUTPATIENT SERVICES | Facility: HOSPITAL | Age: 63
LOS: 1 days | End: 2023-08-22
Payer: MEDICAID

## 2023-08-22 DIAGNOSIS — D24.1 BENIGN NEOPLASM OF RIGHT BREAST: ICD-10-CM

## 2023-08-22 DIAGNOSIS — Z90.710 ACQUIRED ABSENCE OF BOTH CERVIX AND UTERUS: Chronic | ICD-10-CM

## 2023-08-22 PROCEDURE — 77067 SCR MAMMO BI INCL CAD: CPT

## 2023-08-22 PROCEDURE — 77063 BREAST TOMOSYNTHESIS BI: CPT | Mod: 26

## 2023-08-22 PROCEDURE — 77067 SCR MAMMO BI INCL CAD: CPT | Mod: 26

## 2023-08-22 PROCEDURE — 76641 ULTRASOUND BREAST COMPLETE: CPT | Mod: 26,50

## 2023-08-22 PROCEDURE — 77063 BREAST TOMOSYNTHESIS BI: CPT

## 2023-08-22 PROCEDURE — 76641 ULTRASOUND BREAST COMPLETE: CPT

## 2023-08-24 PROBLEM — D24.1 INTRADUCTAL PAPILLOMA OF BREAST, RIGHT: Status: ACTIVE | Noted: 2021-09-24

## 2023-08-31 ENCOUNTER — APPOINTMENT (OUTPATIENT)
Dept: SURGICAL ONCOLOGY | Facility: CLINIC | Age: 63
End: 2023-08-31
Payer: MEDICAID

## 2023-08-31 VITALS
SYSTOLIC BLOOD PRESSURE: 121 MMHG | DIASTOLIC BLOOD PRESSURE: 80 MMHG | WEIGHT: 178 LBS | HEIGHT: 69 IN | RESPIRATION RATE: 16 BRPM | OXYGEN SATURATION: 97 % | BODY MASS INDEX: 26.36 KG/M2 | HEART RATE: 75 BPM

## 2023-08-31 DIAGNOSIS — D24.1 BENIGN NEOPLASM OF RIGHT BREAST: ICD-10-CM

## 2023-08-31 PROCEDURE — 99214 OFFICE O/P EST MOD 30 MIN: CPT

## 2023-08-31 NOTE — PHYSICAL EXAM
Med refill request completed   [Normal] : normal breast inspection and palpation of axillas [Normal Neck Lymph Nodes] : normal neck lymph nodes  [Normal Supraclavicular Lymph Nodes] : normal supraclavicular lymph nodes [Normal Groin Lymph Nodes] : normal groin lymph nodes [Normal Axillary Lymph Nodes] : normal axillary lymph nodes [Normal] : oriented to person, place and time, with appropriate affect [FreeTextEntry1] : SS present during exam  [de-identified] : Well-healed right breast circumareolar incision.

## 2023-08-31 NOTE — CONSULT LETTER
[Dear  ___] : Dear  [unfilled], [Consult Letter:] : I had the pleasure of evaluating your patient, [unfilled]. [Please see my note below.] : Please see my note below. [Consult Closing:] : Thank you very much for allowing me to participate in the care of this patient.  If you have any questions, please do not hesitate to contact me. [Sincerely,] : Sincerely, [FreeTextEntry2] : Matt Louis MD \par   [FreeTextEntry3] : Umesh Quan MD\par  Surgical Oncology\par  [facility]\par  Office: [phone]\par  Cell: [phone]\par

## 2023-08-31 NOTE — HISTORY OF PRESENT ILLNESS
[de-identified] : Polina is a pleasant 63 year old female who presents today for a follow up visit.   She underwent her annual routine screening mammogram and sonogram on 8/21/21. This study demonstrated focal asymmetry within the lower right breast, as above. Further evaluation with full right ML view, spot compression views, and targeted ultrasound, as warranted, is advised, BI-RADS 0.  As follow up, she underwent a right diagnostic mammogram and target right breast ultrasound on 8/30/21. This noted highly suspicious masses at right breast 3:00 and 6:00. Ultrasound-guided core needle biopsy is recommended for further evaluation, BI-RADS 5.   She underwent a right breast US guided biopsy x 2 sites on 9/9/21. Right breast 3:00 2 cm FN, complex sclerosing papillary lesion focal microcalcifications.  Right breast 6:00 3 cm FN, intraductal papilloma, proliferative fibrocystic change with microcalcifications. These results were concordant.  She has no significant past medical history. She states in the past she was anemic in the past due to fibroids, she had a hysterectomy 2005. She denies a personal or family history of breast cancer.   **SURGERY** She is s/p TIBURCIO  localized right breast excisional biopsies x 2 on 10/19/21. Final pathology revealed a diagnosis of proliferative fibrocystic changes including multiple complex sclerosing lesions, florid usual duct hyperplasia, intraductal papillomatosis, sclerosing adenosis and microcalcifications.   MMG/ US on 8/19/22 revealed no evidence of malignancy BI-RADS 2  Most recent breast imaging consists of a bilateral mammo/sono from 8/2023 with benign findings.  8/31/2023 - Polina returns for ongoing follow up, she denies palpable breast masses, nipple discharge, skin changes, inversion or breast pain.

## 2024-04-01 NOTE — ASU DISCHARGE PLAN (ADULT/PEDIATRIC) - PAIN MANAGEMENT
"Daily Note   POC expires Auth Status Total     Start date  Expiration date PT/OT + Visit Limit? Co-Insurance   3/27/24                                                 Visit/Unit Tracking  AUTH Status:  Date 2/28 3/4 3/6 3/20 3/25            Authed:  Used 13 14 15 16 17           Remaining                  Today's date: 2024  Patient name: Daniel Sanz Jr. \"Alfonzo\"  : 1942  MRN: [id number]  Referring provider: Jovanna Cavanaugh MD  Dx:   Encounter Diagnosis     ICD-10-CM    1. Parkinson's disease, unspecified whether dyskinesia present, unspecified whether manifestations fluctuate  G20.A1                     Subjective: Patient arrives to treatment without AD stating his back is a little stiff this morning      Objective: See treatment diary below    TE:  - Physioball rollouts: 20x 10 sec holds    NMR:  - Floor to ceiling: 10x 10 sec holds  - Side to side: 10x 10 sec holds, 2 sets  - STS: 20x 2 sets  - Ambulation w/ walking poles: 100 ft total  - Step ups: 20x 6\" step      TA:  - HEP: Floor to ceiling, side to side, STS      Assessment: Patient tolerated treatment well. Due to increased low back stiffness reported, physioball rollouts completed to reduce patients pain level and improve his overall mobility. Patient displays fair use of walking poles during ambulation, requiring additional verbal cueing to maintain reciprocal UE/LE movements. Patient will benefit from skilled PT services to reduce his risk of falls and maximize his level of safety and independence.      Plan: Continue per plan of care.      Outcome Measures Initial Eval  1/3/2024 PN  24 PN  3/4/24      5xSTS 20.5 sec w/ 1 UE 23.4 sec w/ Yue 17.2 sec w/ Yue  14.4 w/ airex      TUG  - Regular  - Cognitive  - Carry   23.9 sec  58.5 sec (unable to count)  30.8 sec (+ spillage)   29.4 sec  65.4 sec  43.3 sec (- spillage)   31.7 sec  32 sec  34.2 sec (- spillage)      MiniBEST /28        10 meter 0.48 m/s 0.53 m/s 0.52 m/s      6MWT  ft 300 " ft in 4:36 460 ft       ABC %        PDQ-39 /100        MDS-UPDRS  Part III   /128        H&Y Stage         Floor > Stand  sec                         Precautions:   Past Medical History:   Diagnosis Date    Arthritis     Asbestos exposure     Asbestosis (HCC)     GERD (gastroesophageal reflux disease)     Hemoptysis 5/11/2022    Hypertension     Lung disease     Parkinson's disease            Prescriptions electronically submitted to pharmacy from [facility]

## 2024-08-21 ENCOUNTER — APPOINTMENT (OUTPATIENT)
Dept: MAMMOGRAPHY | Facility: IMAGING CENTER | Age: 64
End: 2024-08-21
Payer: MEDICAID

## 2024-08-21 ENCOUNTER — RESULT REVIEW (OUTPATIENT)
Age: 64
End: 2024-08-21

## 2024-08-21 ENCOUNTER — APPOINTMENT (OUTPATIENT)
Dept: ULTRASOUND IMAGING | Facility: IMAGING CENTER | Age: 64
End: 2024-08-21
Payer: MEDICAID

## 2024-08-21 PROCEDURE — 77062 BREAST TOMOSYNTHESIS BI: CPT | Mod: 26

## 2024-08-21 PROCEDURE — 76641 ULTRASOUND BREAST COMPLETE: CPT | Mod: 26,50

## 2024-08-21 PROCEDURE — 77066 DX MAMMO INCL CAD BI: CPT | Mod: 26

## 2024-09-05 ENCOUNTER — APPOINTMENT (OUTPATIENT)
Dept: SURGICAL ONCOLOGY | Facility: CLINIC | Age: 64
End: 2024-09-05
Payer: MEDICARE

## 2024-09-05 VITALS
HEART RATE: 82 BPM | HEIGHT: 69 IN | SYSTOLIC BLOOD PRESSURE: 130 MMHG | WEIGHT: 178 LBS | BODY MASS INDEX: 26.36 KG/M2 | DIASTOLIC BLOOD PRESSURE: 80 MMHG | OXYGEN SATURATION: 97 %

## 2024-09-05 DIAGNOSIS — D24.1 BENIGN NEOPLASM OF RIGHT BREAST: ICD-10-CM

## 2024-09-05 PROCEDURE — 99204 OFFICE O/P NEW MOD 45 MIN: CPT

## 2024-09-05 NOTE — HISTORY OF PRESENT ILLNESS
[de-identified] : Polina is a pleasant 64-year-old female who presents today for a follow up visit.   She underwent her annual routine screening mammogram and sonogram on 8/21/21. This study demonstrated focal asymmetry within the lower right breast, as above. Further evaluation with full right ML view, spot compression views, and targeted ultrasound, as warranted, is advised, BI-RADS 0.  As follow up, she underwent a right diagnostic mammogram and target right breast ultrasound on 8/30/21. This noted highly suspicious masses at right breast 3:00 and 6:00. Ultrasound-guided core needle biopsy is recommended for further evaluation, BI-RADS 5.   She underwent a right breast US guided biopsy x 2 sites on 9/9/21. Right breast 3:00 2 cm FN, complex sclerosing papillary lesion focal microcalcifications.  Right breast 6:00 3 cm FN, intraductal papilloma, proliferative fibrocystic change with microcalcifications. These results were concordant.  She has no significant past medical history. She states in the past she was anemic in the past due to fibroids, she had a hysterectomy 2005. She denies a personal or family history of breast cancer.   **SURGERY** She is s/p TIBURCIO  localized right breast excisional biopsies x 2 on 10/19/21. Final pathology revealed a diagnosis of proliferative fibrocystic changes including multiple complex sclerosing lesions, florid usual duct hyperplasia, intraductal papillomatosis, sclerosing adenosis and microcalcifications.   MMG/ US on 8/19/22 revealed no evidence of malignancy BI-RADS 2  Bilateral mammo/sono from 8/2023 with benign findings.  8/31/2023 - Polina returns for ongoing follow up, she denies palpable breast masses, nipple discharge, skin changes, inversion or breast pain.   B/L mammo/sono 8/2024 - BIRADS 2   9/5/2024 - Polina returns for ongoing follow-up, she denies any new health issues. She denies palpable breast masses, nipple discharge, skin changes, inversion or breast pain. She is doing well overall.

## 2024-09-05 NOTE — PHYSICAL EXAM
[Normal] : normal breast inspection and palpation of axillas [Normal Neck Lymph Nodes] : normal neck lymph nodes  [Normal Supraclavicular Lymph Nodes] : normal supraclavicular lymph nodes [Normal Groin Lymph Nodes] : normal groin lymph nodes [Normal Axillary Lymph Nodes] : normal axillary lymph nodes [Normal] : oriented to person, place and time, with appropriate affect [FreeTextEntry1] : SS present during exam  [de-identified] : Well-healed right breast circumareolar incision.

## 2024-09-05 NOTE — PHYSICAL EXAM
[Normal] : normal breast inspection and palpation of axillas [Normal Neck Lymph Nodes] : normal neck lymph nodes  [Normal Supraclavicular Lymph Nodes] : normal supraclavicular lymph nodes [Normal Groin Lymph Nodes] : normal groin lymph nodes [Normal Axillary Lymph Nodes] : normal axillary lymph nodes [Normal] : oriented to person, place and time, with appropriate affect [FreeTextEntry1] : SS present during exam  [de-identified] : Well-healed right breast circumareolar incision.

## 2024-09-05 NOTE — CONSULT LETTER
[Dear  ___] : Dear  [unfilled], [Consult Letter:] : I had the pleasure of evaluating your patient, [unfilled]. [Please see my note below.] : Please see my note below. [Consult Closing:] : Thank you very much for allowing me to participate in the care of this patient.  If you have any questions, please do not hesitate to contact me. [Sincerely,] : Sincerely, [FreeTextEntry2] : Matt Louis MD \par   [FreeTextEntry3] : Umesh Quan MD\par  Surgical Oncology\par  [facility]\par  Office: [phone]\par  Cell: [phone]\par

## 2024-09-05 NOTE — ASSESSMENT
[FreeTextEntry1] : Polina is doing well, she will follow-up in 1 year after her annual breast imaging. She is tearful today recalling her sister and brother that recently passed.  She will let us know if she wishes to seek Emotional Support Services.   All medical entries were at my, Dr. Umesh Quan, direction. I have reviewed the chart and agree that the record accurately reflects my personal performance of the history, physical exam, assessment, and plan.  Our office nurse practitioner was present for the duration of the office visit.

## 2024-09-05 NOTE — HISTORY OF PRESENT ILLNESS
[de-identified] : Polina is a pleasant 64-year-old female who presents today for a follow up visit.   She underwent her annual routine screening mammogram and sonogram on 8/21/21. This study demonstrated focal asymmetry within the lower right breast, as above. Further evaluation with full right ML view, spot compression views, and targeted ultrasound, as warranted, is advised, BI-RADS 0.  As follow up, she underwent a right diagnostic mammogram and target right breast ultrasound on 8/30/21. This noted highly suspicious masses at right breast 3:00 and 6:00. Ultrasound-guided core needle biopsy is recommended for further evaluation, BI-RADS 5.   She underwent a right breast US guided biopsy x 2 sites on 9/9/21. Right breast 3:00 2 cm FN, complex sclerosing papillary lesion focal microcalcifications.  Right breast 6:00 3 cm FN, intraductal papilloma, proliferative fibrocystic change with microcalcifications. These results were concordant.  She has no significant past medical history. She states in the past she was anemic in the past due to fibroids, she had a hysterectomy 2005. She denies a personal or family history of breast cancer.   **SURGERY** She is s/p TIBURCIO  localized right breast excisional biopsies x 2 on 10/19/21. Final pathology revealed a diagnosis of proliferative fibrocystic changes including multiple complex sclerosing lesions, florid usual duct hyperplasia, intraductal papillomatosis, sclerosing adenosis and microcalcifications.   MMG/ US on 8/19/22 revealed no evidence of malignancy BI-RADS 2  Bilateral mammo/sono from 8/2023 with benign findings.  8/31/2023 - Polina returns for ongoing follow up, she denies palpable breast masses, nipple discharge, skin changes, inversion or breast pain.   B/L mammo/sono 8/2024 - BIRADS 2   9/5/2024 - Polina returns for ongoing follow-up, she denies any new health issues. She denies palpable breast masses, nipple discharge, skin changes, inversion or breast pain. She is doing well overall.

## 2024-09-05 NOTE — CONSULT LETTER
[Dear  ___] : Dear  [unfilled], [Consult Letter:] : I had the pleasure of evaluating your patient, [unfilled]. [Please see my note below.] : Please see my note below. [Consult Closing:] : Thank you very much for allowing me to participate in the care of this patient.  If you have any questions, please do not hesitate to contact me. [Sincerely,] : Sincerely, [FreeTextEntry2] : Matt Louis MD \par   [FreeTextEntry3] : Umesh Quan MD\par  Surgical Oncology\par  [facility]\par  Office: [phone]\par  Cell: [phone]\par

## 2025-08-26 ENCOUNTER — RESULT REVIEW (OUTPATIENT)
Age: 65
End: 2025-08-26

## 2025-08-26 ENCOUNTER — APPOINTMENT (OUTPATIENT)
Dept: MAMMOGRAPHY | Facility: IMAGING CENTER | Age: 65
End: 2025-08-26
Payer: MEDICARE

## 2025-08-26 ENCOUNTER — OUTPATIENT (OUTPATIENT)
Dept: OUTPATIENT SERVICES | Facility: HOSPITAL | Age: 65
LOS: 1 days | End: 2025-08-26
Payer: MEDICARE

## 2025-08-26 ENCOUNTER — APPOINTMENT (OUTPATIENT)
Dept: ULTRASOUND IMAGING | Facility: IMAGING CENTER | Age: 65
End: 2025-08-26
Payer: MEDICARE

## 2025-08-26 DIAGNOSIS — D24.1 BENIGN NEOPLASM OF RIGHT BREAST: ICD-10-CM

## 2025-08-26 DIAGNOSIS — Z90.710 ACQUIRED ABSENCE OF BOTH CERVIX AND UTERUS: Chronic | ICD-10-CM

## 2025-08-26 PROCEDURE — 77067 SCR MAMMO BI INCL CAD: CPT

## 2025-08-26 PROCEDURE — 77067 SCR MAMMO BI INCL CAD: CPT | Mod: 26

## 2025-08-26 PROCEDURE — 76641 ULTRASOUND BREAST COMPLETE: CPT | Mod: 26,50

## 2025-08-26 PROCEDURE — 76641 ULTRASOUND BREAST COMPLETE: CPT

## 2025-08-26 PROCEDURE — 77063 BREAST TOMOSYNTHESIS BI: CPT

## 2025-08-26 PROCEDURE — 77063 BREAST TOMOSYNTHESIS BI: CPT | Mod: 26

## 2025-09-11 ENCOUNTER — APPOINTMENT (OUTPATIENT)
Dept: SURGICAL ONCOLOGY | Facility: CLINIC | Age: 65
End: 2025-09-11
Payer: MEDICARE

## 2025-09-11 VITALS
WEIGHT: 178 LBS | HEIGHT: 69 IN | OXYGEN SATURATION: 98 % | BODY MASS INDEX: 26.36 KG/M2 | DIASTOLIC BLOOD PRESSURE: 84 MMHG | SYSTOLIC BLOOD PRESSURE: 140 MMHG | HEART RATE: 71 BPM

## 2025-09-11 DIAGNOSIS — D24.1 BENIGN NEOPLASM OF RIGHT BREAST: ICD-10-CM

## 2025-09-11 PROCEDURE — 99214 OFFICE O/P EST MOD 30 MIN: CPT
